# Patient Record
Sex: FEMALE | Race: OTHER | HISPANIC OR LATINO | Employment: FULL TIME | URBAN - METROPOLITAN AREA
[De-identification: names, ages, dates, MRNs, and addresses within clinical notes are randomized per-mention and may not be internally consistent; named-entity substitution may affect disease eponyms.]

---

## 2024-08-08 ENCOUNTER — HOSPITAL ENCOUNTER (OUTPATIENT)
Dept: RADIOLOGY | Facility: CLINIC | Age: 59
Discharge: HOME | End: 2024-08-08
Payer: COMMERCIAL

## 2024-08-08 ENCOUNTER — OFFICE VISIT (OUTPATIENT)
Dept: ORTHOPEDIC SURGERY | Facility: CLINIC | Age: 59
End: 2024-08-08
Payer: COMMERCIAL

## 2024-08-08 DIAGNOSIS — M25.561 ACUTE PAIN OF RIGHT KNEE: ICD-10-CM

## 2024-08-08 NOTE — PROGRESS NOTES
Subjective    Patient ID: Say Oates is a 58 y.o. female.    Chief Complaint: RIGHT KNEE PAIN     HPI  This is a pleasant 58-year-old female presenting to the office for evaluation of right knee pain.    The patient's past medical, surgical, family, and social history as well as allergies and medications were reviewed and updated in the chart.    Objective   Ortho Exam  Pleasant and no acute distress. Walks with antalgic gait. Stands with varus alignment of the right knee and neutral on the left.  Right knee range of motion is 5-110°. The knee is stable to varus and valgus stress Lachman and posterior drawer. There is a mild effusion. There is generalized tenderness. Left knee range of motion is 0-120°. There is no effusion. The knee is stable to varus and valgus stress Lachman and posterior drawer. Both lower extremities are well perfused the skin is intact and muscle tone is adequate.      Image Results:  Multiple view x-rays of the right knee obtained today personally reviewed, without evidence of acute fracture or dislocation.    Assessment/Plan   Encounter Diagnoses:  Acute pain of right knee    Plan: Discussion with patient about diagnosis with review of today's right knee x-rays.  Conservative treatment options were discussed at length.    Orders Placed This Encounter    XR knee right 4+ views     No follow-ups on file.